# Patient Record
Sex: FEMALE | Race: BLACK OR AFRICAN AMERICAN | NOT HISPANIC OR LATINO | Employment: FULL TIME | URBAN - METROPOLITAN AREA
[De-identification: names, ages, dates, MRNs, and addresses within clinical notes are randomized per-mention and may not be internally consistent; named-entity substitution may affect disease eponyms.]

---

## 2024-05-08 ENCOUNTER — OFFICE VISIT (OUTPATIENT)
Dept: OPTOMETRY | Facility: CLINIC | Age: 24
End: 2024-05-08
Payer: COMMERCIAL

## 2024-05-08 DIAGNOSIS — H52.13 MYOPIA, BILATERAL: Primary | ICD-10-CM

## 2024-05-08 PROCEDURE — 92015 DETERMINE REFRACTIVE STATE: CPT

## 2024-05-08 PROCEDURE — 92004 COMPRE OPH EXAM NEW PT 1/>: CPT

## 2024-05-08 ASSESSMENT — SLIT LAMP EXAM - LIDS
COMMENTS: NORMAL
COMMENTS: NORMAL

## 2024-05-08 ASSESSMENT — KERATOMETRY
OD_K1POWER_DIOPTERS: 45.50
OS_AXISANGLE_DEGREES: 86
OS_K1POWER_DIOPTERS: 45.25
OS_AXISANGLE2_DEGREES: 176
OD_K2POWER_DIOPTERS: 47.00
OD_AXISANGLE_DEGREES: 89
OS_K2POWER_DIOPTERS: 47.00
OD_AXISANGLE2_DEGREES: 179

## 2024-05-08 ASSESSMENT — VISUAL ACUITY
OS_PH_SC: 20/30
OD_SC: 20/200
OS_SC: 20/100
OS_SC: 20/20
METHOD: SNELLEN - LINEAR
OD_PH_SC+: -2
OD_SC: 20/20
OD_PH_SC: 20/50

## 2024-05-08 ASSESSMENT — REFRACTION_MANIFEST
OD_SPHERE: -3.75
OS_AXIS: 095
OD_AXIS: 087
OS_AXIS: 091
OS_SPHERE: -3.00
OD_CYLINDER: +0.50
METHOD_AUTOREFRACTION: 1
OS_SPHERE: -3.25
OS_CYLINDER: +0.50
OD_CYLINDER: +0.75
OD_SPHERE: -3.50
OS_CYLINDER: +0.50
OD_AXIS: 100

## 2024-05-08 ASSESSMENT — TONOMETRY
IOP_METHOD: TONOPEN
OS_IOP_MMHG: 14
OD_IOP_MMHG: 13

## 2024-05-08 ASSESSMENT — CONF VISUAL FIELD
OD_INFERIOR_NASAL_RESTRICTION: 0
OS_INFERIOR_TEMPORAL_RESTRICTION: 0
OD_SUPERIOR_TEMPORAL_RESTRICTION: 0
OS_INFERIOR_NASAL_RESTRICTION: 0
OD_SUPERIOR_NASAL_RESTRICTION: 0
OD_NORMAL: 1
OS_SUPERIOR_TEMPORAL_RESTRICTION: 0
OD_INFERIOR_TEMPORAL_RESTRICTION: 0
OS_NORMAL: 1
OS_SUPERIOR_NASAL_RESTRICTION: 0

## 2024-05-08 ASSESSMENT — CUP TO DISC RATIO
OS_RATIO: 0.25
OD_RATIO: 0.2

## 2024-05-08 ASSESSMENT — EXTERNAL EXAM - RIGHT EYE: OD_EXAM: NORMAL

## 2024-05-08 ASSESSMENT — EXTERNAL EXAM - LEFT EYE: OS_EXAM: NORMAL

## 2024-05-08 NOTE — LETTER
5/8/2024         RE: Aubrey Fernandez  [address]  [facility] [address]        Dear Colleague,    Thank you for referring your patient, Aubrey Fernandez, to the [facility]. Please see a copy of my visit note below.    Chief Complaint   Patient presents with     Annual Eye Exam         Last Eye Exam: 2022  Dilated Previously: Yes    What are you currently using to see?  Lost Glasses in April         Distance Vision Acuity: Satisfied with vision    Near Vision Acuity: Satisfied with vision while reading and using computer unaided    Eye Comfort: good  Do you use eye drops? 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       OBJECTIVE: See Ophthalmology exam    ASSESSMENT:    ICD-10-CM    1. Myopia, bilateral  H52.13 REFRACTION     EYE EXAM (SIMPLE-NONBILLABLE)          PLAN:     Patient Instructions   Myopia, bilateral: Updated glasses prescription for full-time wear.  Monitor annually.         Paradise Ramirez, NATHALY      Again, thank you for allowing me to participate in the care of your patient.        Sincerely,        Paradise Ramirez, OD

## 2024-05-08 NOTE — PROGRESS NOTES
Chief Complaint   Patient presents with    Annual Eye Exam         Last Eye Exam: 2022  Dilated Previously: Yes    What are you currently using to see?  Lost Glasses in April         Distance Vision Acuity: Satisfied with vision    Near Vision Acuity: Satisfied with vision while reading and using computer unaided    Eye Comfort: good  Do you use eye drops? 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       OBJECTIVE: See Ophthalmology exam    ASSESSMENT:    ICD-10-CM    1. Myopia, bilateral  H52.13 REFRACTION     EYE EXAM (SIMPLE-NONBILLABLE)          PLAN:     Patient Instructions   Myopia, bilateral: Updated glasses prescription for full-time wear.  Monitor annually.         Paradise Ramirez, OD

## 2024-05-16 ENCOUNTER — APPOINTMENT (OUTPATIENT)
Dept: OPTOMETRY | Facility: CLINIC | Age: 24
End: 2024-05-16
Payer: COMMERCIAL

## 2024-05-16 PROCEDURE — 92340 FIT SPECTACLES MONOFOCAL: CPT
